# Patient Record
(demographics unavailable — no encounter records)

---

## 2025-04-01 NOTE — HISTORY OF PRESENT ILLNESS
[FreeTextEntry1] : HPI She is 29 y/o F with medical history of Rhabdomyolysis x 2. Patient states that about 3-4 years ago she had experienced myalgias after exercising. She was noted to have CPK of ~20,000. She was asked to hydrate and at that time there was no evidence of acute kidney Injury. She had no further episodes of rhabdo till recently when she did some weights and hot yoga following that. She experienced muscle stiffness which prompted her to go to Urgent care. There they noted her CPK to be 3000. She came to [facility] ED and repeat CPK was 4000. She was given IV fluids. Her renal function remained normal and her UA did not show any evidence of myoglobulins. At discharge from ED her CPK was ~3000. She was told to follow up with nephrology for which she is here today.   Medications: Estrogen based OCP Family History: No significant family history.  Social History: Works at an [employer] agency. Does not smoke, drink alcohol or does any illicit drugs.

## 2025-04-01 NOTE — ASSESSMENT
[FreeTextEntry1] : -> Repeat CPK, UA and CMP today to ensure resolution of elevated liver enzymes.  -> Rheumatology referral provided to investigate if she has metabolic myopathies. The reason to suspect this is that she has had two episodes of rhabdomyolysis w/o any prolonged or strenous exercise.   RTC PRN
(4) walks frequently

## 2025-04-01 NOTE — REVIEW OF SYSTEMS
[Fever] : no fever [Chills] : no chills [Shortness Of Breath] : no shortness of breath [Wheezing] : no wheezing [Cough] : no cough [SOB on Exertion] : no shortness of breath during exertion [Abdominal Pain] : no abdominal pain [Vomiting] : no vomiting [Dysuria] : no dysuria [Incontinence] : no incontinence [Confused] : no confusion [Convulsions] : no convulsions

## 2025-04-03 NOTE — HISTORY OF PRESENT ILLNESS
[FreeTextEntry1] : new CPE [de-identified] :  30 year old F presenting to establish care and to discuss the following:  June 2022 - highest CK about 20k March 2025 - rhabdo again   10 lb dumbbells each time  Solidcore and hot yoga.   Biceps and forearms -   Asthma - allergy induced. montelukast and zyrtec and steroid inhaler.   PSHx: Breast reduction 2017   Hospitalizations/ED visits:  Pneumonia as a child  4-5 concussions from soccer  Right ankle injury   Current Medications:  Birth control -   Allergies: Cefsil   Social Hx: Alcohol: 2-3 per week Tobacco: Never Drugs: None Sexual activity: Men - not recently.  Diet: Healthy. No red meat.   Exercise: Mood: Ok. Stressful work right. Major fatigue. Lives with: Alone Work: Advertising. Stressful right now. Feeling exhausted.    Family Hx: Mother: Unknown autoimmune condition.  Father: Siblings: Some people on mother's side with DM Paternal grandfather with MI

## 2025-04-03 NOTE — HISTORY OF PRESENT ILLNESS
[FreeTextEntry1] : new CPE [de-identified] :  30 year old F presenting to establish care and to discuss the following:  June 2022 - highest CK about 20k March 2025 - rhabdo again   10 lb dumbbells each time  Solidcore and hot yoga.   Biceps and forearms -   Asthma - allergy induced. montelukast and zyrtec and steroid inhaler.   PSHx: Breast reduction 2017   Hospitalizations/ED visits:  Pneumonia as a child  4-5 concussions from soccer  Right ankle injury   Current Medications:  Birth control -   Allergies: Cefsil   Social Hx: Alcohol: 2-3 per week Tobacco: Never Drugs: None Sexual activity: Men - not recently.  Diet: Healthy. No red meat.   Exercise: Mood: Ok. Stressful work right. Major fatigue. Lives with: Alone Work: Advertising. Stressful right now. Feeling exhausted.    Family Hx: Mother: Unknown autoimmune condition.  Father: Siblings: Some people on mother's side with DM Paternal grandfather with MI

## 2025-05-16 NOTE — HISTORY OF PRESENT ILLNESS
[de-identified] : Mid back [FreeTextEntry8] : 30 year old F with asthma and 2 recent episodes of rhabdomyolysis presenting today with lower back pain after dancing at a concert a few years ago. Mid lower pain with movement. Not radiating. No other symptoms.   No symptoms of rhabdo since last visit. Has been avoiding lifting weights.

## 2025-05-16 NOTE — REVIEW OF SYSTEMS
[Fever] : no fever [Chest Pain] : no chest pain [Palpitations] : no palpitations [Lower Ext Edema] : no lower extremity edema [Shortness Of Breath] : no shortness of breath [Cough] : no cough [Dyspnea on Exertion] : no dyspnea on exertion [Abdominal Pain] : no abdominal pain [Nausea] : no nausea [Constipation] : no constipation [Diarrhea] : diarrhea [Vomiting] : no vomiting [Dysuria] : no dysuria [Joint Pain] : no joint pain [Back Pain] : back pain

## 2025-05-16 NOTE — PHYSICAL EXAM
[No Acute Distress] : no acute distress [No Respiratory Distress] : no respiratory distress  [No Accessory Muscle Use] : no accessory muscle use [Clear to Auscultation] : lungs were clear to auscultation bilaterally [Normal Rate] : normal rate  [Regular Rhythm] : with a regular rhythm [Normal S1, S2] : normal S1 and S2 [Soft] : abdomen soft [Non Tender] : non-tender [No Rash] : no rash [Coordination Grossly Intact] : coordination grossly intact [Normal Affect] : the affect was normal [Normal Insight/Judgement] : insight and judgment were intact [de-identified] : Some mild tenderness over lumbar spine.

## 2025-05-20 NOTE — DISCUSSION/SUMMARY
Group Topic: BH Check-in/Symptom Rating    Date: 6/21/2021  Start Time: 0930  End Time: 1030  Facilitators: Colton Galloway CNA    Focus: morning gropup  Number in attendance: 15    Method: Group  Attendance: Present  Participation: Active  Patient Response: Good eye contact  Mood: Normal  Affect: Type: Euthymic (normal mood)   Range: Full (normal)   Congruency: Congruent   Stability: Stable  Behavior/Socialization: Cooperative  Thought Process: [place] thinking  Task Performance: Follows directions  Patient Evaluation: Independent - full participation       [de-identified] : ASSESSMENT Discussed findings of today's exam and possible cause of patient's pain.  Educated patient on their most probable diagnosis of mid thoracic left side paraspinal strain. On examination patient is neurologic intact both with motor and sensation and there is an absence of any red flag symptoms.  She is point tender over the paraspinal muscles of the left mid thoracic region. I explained that strains are defined as tears of the muscle-tendon unit that occurs during excessively forceful muscular stretch from lifting heavy objects or sudden twisting motions.  Sprains are ligamentous injuries (i.e ant/post longitudinal, capsular, inter/supra spinosus) that are caused by sudden violent contraction, sudden torsion, or foreceful straightening from a crouched position. Typical symptoms are pain and spasm localized over the posterior thoracic spinal muscles. ROM is usually limited secondary to pain and there is an absence of any neurologic signs/symptoms.  We reviewed possible courses of treatment, and we collaboratively decided best course of treatment at this time includes the following:  PLAN 1. Procedures: None at this time 2. Physical Therapy: Recommend gentle stretching and ROM exercises. Physical therapy was prescribed, to evaluate and treat, modalities and manual treatment as appropriate, taping as appropriate as well. 3. Medications/Treatments: Over-the-counter NSAIDs as needed 4. Imaging: None at this time.  I explained that MRI's are reserved for patients with red flags including fevers, chills, unintentional weight loss, dropped foot or severe weakness, bowel or bladder incontinence which are signs of medical emergencies including cancer, infection, severely pinched nerve, and a medical complication known as cauda equina syndrome. I advised to the patient that if any of these symptoms present then notify a physician immediately and present to the ER. 5. Labs: None 6. Orthopedic Supplies: None 7. Activity/Restrictions: Patient was counseled regarding activity/activity modification.  Activities as tolerated, avoiding any painful activities with only slow gradual advances as tolerated and once ready.  Advised to pay close attention on whether there is any pain during and/or after the activity, in which case if this occurs, the patient should back off on the activity. 8. Referrals: None 9. Follow-up: As needed  Patient appreciates and agrees with current plan.  This note was generated using Dragon medical dictation software.  A reasonable effort has been made for proofreading its contents, but typos may still remain.  If there are any questions or points of clarification needed, please notify my office.  This office visit included some or all of the following of both face-to-face time (preparation for visit-reviewing prior notes, performing H&P, ordering of medications, tests/ performing procedures, and counseling/education to the patient/family) and non-face-to-face time (deciding on recommendations to patients, independently interpreting tests, documentation in the EMR, communicating with other providers before or during the visit).    I have spent a total time of 45 minutes on this patient encounter on the same day of 5/20/2025.  Juan R Gonzalez MD, CAM

## 2025-05-20 NOTE — PHYSICAL EXAM
[de-identified] : THORACIC BACK EXAM ROM:  Flexion: Full 0-50 degrees with pain Extension: Full 0-45 degrees Sidebending: Full 0-40 degrees Rotation: Full 0-30 degrees  LUMBAR SPINE EXAM  Gait: Normal stride length, no trendelenberg gait, no antalgic gait Inspection: No more deformities or malalignment, normal curvature of the lumbosacral spine, good posture, no swelling or ecchymosis Assistive Devices: None  BACK TENDERNESS:  Spinous Processes: None Latissimus Dorsi (adduct, extend, internal rotation humerus) : None Longissimus Thoracis: None Iliocostalis: None Internal Obliques: None Paraspinal muscles (Psoas major, Quadratus Lumborum, Erector spinae group): Positive left side SI Joint: None Sacrum: None  ROM:  Flexion: Full 0-60 degrees Extension: Full 0-25 degrees Lateral Flexion (sidebending): Full 0-25 degrees Rotation: Full 0-15 degrees  DERMATOMAL SENSATION (bilateral):  L1 : Intact L2 : Intact L3 : Intact L4: Intact L5: Intact   S1: Intact  STRENGTH (bilateral):  Hip Flexion (L2/3): 5/5 Hip Adduction (L2): 5/5 Hip Abduction (L4/5/S1) : 5/5 Knee Extension (L3/4): 5/5 Knee Flexion (L5/S1): 5/5 Foot Plantarflexion (S1/2): 5/5 Ankle Dorsiflexion (L4): 5/5 Great Toe Extension: 5/5   REFLEXES:  Patellar: 2+ bilateral Achilles: 2+ bilateral Clonus: Negative bilateral  SPECIAL TESTING (bilateral):  SUPINE STRAIGHT LEG: Negative SLUMP: Negative  SI JOINT:  ASIS DISTRACTION (most specific-hold for 30): Negative ASIS COMPRESSION TEST (hold for 30): negative ELIUD SI PAIN: negative GAENSLEN'S TEST: negative STORK TEST: negative  RIGHT HIP:  LOG ROLL: Negative ELIUD: Negative FADIR: Negative  LEFT HIP:  LOG ROLL: Negative ELIUD: Negative FADIR: Negative  [de-identified] : XR lumbar spine 2 views AP lateral, XR thoracic spine 2 views AP/lateral Impression:

## 2025-05-20 NOTE — HISTORY OF PRESENT ILLNESS
[de-identified] : CC: This is a very pleasant 30-year-old female that presents to the office today as a new patient with back pain.  Initially noticed back pain after attending a concert on May 1.  She felt a pain in the left mid back thereafter.  She states that the pain persisted and then went to a yoga class on May 11 and on a flexion maneuver noticed increased pain.  She states the pain improved from that day however it is about the same as when she initially noticed it after attending the concert.  The pain is localized to the left mid back.  There is no numbness/tingling down her upper or lower extremities.  There is no weakness or recent falls.  Denies any bruising or swelling.  Has been taking Aleve for anti-inflammatory use.  She saw her primary care doctor on 516 and was advised to follow-up.  Referring Provider: Dr. Sherly Gerardo

## 2025-05-20 NOTE — PHYSICAL EXAM
[de-identified] : THORACIC BACK EXAM ROM:  Flexion: Full 0-50 degrees with pain Extension: Full 0-45 degrees Sidebending: Full 0-40 degrees Rotation: Full 0-30 degrees  LUMBAR SPINE EXAM  Gait: Normal stride length, no trendelenberg gait, no antalgic gait Inspection: No more deformities or malalignment, normal curvature of the lumbosacral spine, good posture, no swelling or ecchymosis Assistive Devices: None  BACK TENDERNESS:  Spinous Processes: None Latissimus Dorsi (adduct, extend, internal rotation humerus) : None Longissimus Thoracis: None Iliocostalis: None Internal Obliques: None Paraspinal muscles (Psoas major, Quadratus Lumborum, Erector spinae group): Positive left side SI Joint: None Sacrum: None  ROM:  Flexion: Full 0-60 degrees Extension: Full 0-25 degrees Lateral Flexion (sidebending): Full 0-25 degrees Rotation: Full 0-15 degrees  DERMATOMAL SENSATION (bilateral):  L1 : Intact L2 : Intact L3 : Intact L4: Intact L5: Intact   S1: Intact  STRENGTH (bilateral):  Hip Flexion (L2/3): 5/5 Hip Adduction (L2): 5/5 Hip Abduction (L4/5/S1) : 5/5 Knee Extension (L3/4): 5/5 Knee Flexion (L5/S1): 5/5 Foot Plantarflexion (S1/2): 5/5 Ankle Dorsiflexion (L4): 5/5 Great Toe Extension: 5/5   REFLEXES:  Patellar: 2+ bilateral Achilles: 2+ bilateral Clonus: Negative bilateral  SPECIAL TESTING (bilateral):  SUPINE STRAIGHT LEG: Negative SLUMP: Negative  SI JOINT:  ASIS DISTRACTION (most specific-hold for 30): Negative ASIS COMPRESSION TEST (hold for 30): negative ELIUD SI PAIN: negative GAENSLEN'S TEST: negative STORK TEST: negative  RIGHT HIP:  LOG ROLL: Negative ELIUD: Negative FADIR: Negative  LEFT HIP:  LOG ROLL: Negative ELIUD: Negative FADIR: Negative  [de-identified] : XR lumbar spine 2 views AP lateral, XR thoracic spine 2 views AP/lateral Impression:

## 2025-05-20 NOTE — HISTORY OF PRESENT ILLNESS
[de-identified] : CC: This is a very pleasant 30-year-old female that presents to the office today as a new patient with back pain.  Initially noticed back pain after attending a concert on May 1.  She felt a pain in the left mid back thereafter.  She states that the pain persisted and then went to a yoga class on May 11 and on a flexion maneuver noticed increased pain.  She states the pain improved from that day however it is about the same as when she initially noticed it after attending the concert.  The pain is localized to the left mid back.  There is no numbness/tingling down her upper or lower extremities.  There is no weakness or recent falls.  Denies any bruising or swelling.  Has been taking Aleve for anti-inflammatory use.  She saw her primary care doctor on 516 and was advised to follow-up.  Referring Provider: Dr. Sherly Gerardo

## 2025-05-20 NOTE — DISCUSSION/SUMMARY
[de-identified] : ASSESSMENT Discussed findings of today's exam and possible cause of patient's pain.  Educated patient on their most probable diagnosis of mid thoracic left side paraspinal strain. On examination patient is neurologic intact both with motor and sensation and there is an absence of any red flag symptoms.  She is point tender over the paraspinal muscles of the left mid thoracic region. I explained that strains are defined as tears of the muscle-tendon unit that occurs during excessively forceful muscular stretch from lifting heavy objects or sudden twisting motions.  Sprains are ligamentous injuries (i.e ant/post longitudinal, capsular, inter/supra spinosus) that are caused by sudden violent contraction, sudden torsion, or foreceful straightening from a crouched position. Typical symptoms are pain and spasm localized over the posterior thoracic spinal muscles. ROM is usually limited secondary to pain and there is an absence of any neurologic signs/symptoms.  We reviewed possible courses of treatment, and we collaboratively decided best course of treatment at this time includes the following:  PLAN 1. Procedures: None at this time 2. Physical Therapy: Recommend gentle stretching and ROM exercises. Physical therapy was prescribed, to evaluate and treat, modalities and manual treatment as appropriate, taping as appropriate as well. 3. Medications/Treatments: Over-the-counter NSAIDs as needed 4. Imaging: None at this time.  I explained that MRI's are reserved for patients with red flags including fevers, chills, unintentional weight loss, dropped foot or severe weakness, bowel or bladder incontinence which are signs of medical emergencies including cancer, infection, severely pinched nerve, and a medical complication known as cauda equina syndrome. I advised to the patient that if any of these symptoms present then notify a physician immediately and present to the ER. 5. Labs: None 6. Orthopedic Supplies: None 7. Activity/Restrictions: Patient was counseled regarding activity/activity modification.  Activities as tolerated, avoiding any painful activities with only slow gradual advances as tolerated and once ready.  Advised to pay close attention on whether there is any pain during and/or after the activity, in which case if this occurs, the patient should back off on the activity. 8. Referrals: None 9. Follow-up: As needed  Patient appreciates and agrees with current plan.  This note was generated using Dragon medical dictation software.  A reasonable effort has been made for proofreading its contents, but typos may still remain.  If there are any questions or points of clarification needed, please notify my office.  This office visit included some or all of the following of both face-to-face time (preparation for visit-reviewing prior notes, performing H&P, ordering of medications, tests/ performing procedures, and counseling/education to the patient/family) and non-face-to-face time (deciding on recommendations to patients, independently interpreting tests, documentation in the EMR, communicating with other providers before or during the visit).    I have spent a total time of 45 minutes on this patient encounter on the same day of 5/20/2025.  Juan R Gonzalez MD, CAM

## 2025-06-10 NOTE — HISTORY OF PRESENT ILLNESS
[FreeTextEntry1] : CC: history of Rhabdomyolysis CK has increased to 4K after exercise  last 3/2025 and 1st 2021    HPI: 30 y old with PMH of Seasonal allergies and asthma, 2 episode of Rhabdomyolysis with CK increased up to 4K after exercise and first time 2021,  with episodes has muscle pain improved with hydration  history of Lower back pain with lumbar X ray mild DJD   no joint swelling or stiffness,  used to play soccer had BL Knee ACL injury and R ankle injury L elbow also no fractures  used to play in the school   no psoriasis, no photosensitive rash, no increased hair loss,  no dry eyes or mouth no history of uveitis or scleritis  no oral or genital ulcers, no sob no chest pain, no cough , nasal congestion with seasonal allergies and cough, no trouble swallowing, no abd pain, no blood in urine or stool, no persistent diarrhea no recent or recurrent infections   ALL Cefcill -- as a child rash SH : alcohol socially, no other toxic habits  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

## 2025-06-10 NOTE — ASSESSMENT
[FreeTextEntry1] :  A and P   30 y old F With PMH of Seasonal allergies and Asthma, multiple joint trauma with playing soccer in school- has not played since then, exercising regularly but had 2 episode of Rhabdomyolysis first 2021 possible after exercise and dehydration improved with IV fluids and second recent episode 3/2025 evaluated at [facility] ED CK went up to 4K and improved with IV hydration , muscle pain with rhabdomyolysis no sig weakness , history of lower back pain also and intermittent joint pain attributes it to previous trauma , no fractures -recent episode of Rhabdomyelsis after exercising with 10 lbs 3 sets that was not issue for her in the past since then stopped exercising with weights but still does regular cardio -denies recent new medications or supplements  -no other symptoms of myositis or systemic connective tissue disease , MEMO was negative but will evaluate further with serologies autoimmune myositis is less likely discussed with patient evaluate for inflammatory arthritis but less likely  -follow up 2-3 weeks

## 2025-06-10 NOTE — PHYSICAL EXAM
[General Appearance - In No Acute Distress] : in no acute distress [PERRL With Normal Accommodation] : pupils were equal in size, round, and reactive to light [Examination Of The Oral Cavity] : the lips and gums were normal [Oropharynx] : the oropharynx was normal [Respiration, Rhythm And Depth] : normal respiratory rhythm and effort [Auscultation Breath Sounds / Voice Sounds] : lungs were clear to auscultation bilaterally [Heart Rate And Rhythm] : heart rate was normal and rhythm regular [Heart Sounds] : normal S1 and S2 [Edema] : there was no peripheral edema [Murmurs] : no murmurs [Bowel Sounds] : normal bowel sounds [Abdomen Soft] : soft [Abdomen Tenderness] : non-tender [No Spinal Tenderness] : no spinal tenderness [] : no rash [Motor Exam] : the motor exam was normal

## 2025-07-22 NOTE — ASSESSMENT
[FreeTextEntry1] :  A and P   30 y old F With PMH of Seasonal allergies and Asthma, multiple joint trauma with playing soccer in school- has not played since then, exercising regularly but had 2 episode of Rhabdomyolysis first 2021 possible after exercise and dehydration improved with IV fluids and second recent episode 3/2025 evaluated at [facility] ED CK went up to 4K and improved with IV hydration , muscle pain with rhabdomyolysis no sig weakness , history of lower back pain also and intermittent joint pain attributes it to previous trauma , no fractures -recent episode of Rhabdomyelsis after exercising with 10 lbs 3 sets that was not issue for her in the past since then stopped exercising with weights but still does regular cardio -denies recent new medications or supplements , no new symptoms since last visit  -no other symptoms of myositis or systemic connective tissue disease , 4/3/2025 MEMO was negative -6/10/2025 workup normal CK, aldolase , negative RF, CCP, Myomarker panel 4/3/2025 negative MEMO  -discussed that workup negative for inflammatory arthritis autoimmune myositis and also underlying systemic connective tissue disease  -follow up with us as needed -if recurrent Rhabdomyolysis will need evaluation by Neurology   -follow up as needed

## 2025-07-22 NOTE — HISTORY OF PRESENT ILLNESS
[FreeTextEntry1] : 7/22/2025 with history of Rhabdomyolysis twice CK increased to 4 K and improved with hydration after exercise no new symptoms intermittent muscle pain 6/10/2025 workup normal CK, aldolase , negative RF, CCP, Myomarker panel 4/3/2025 negative MEMO

## 2025-07-22 NOTE — PHYSICAL EXAM
[General Appearance - In No Acute Distress] : in no acute distress [PERRL With Normal Accommodation] : pupils were equal in size, round, and reactive to light [Examination Of The Oral Cavity] : the lips and gums were normal [Oropharynx] : the oropharynx was normal [Respiration, Rhythm And Depth] : normal respiratory rhythm and effort [Auscultation Breath Sounds / Voice Sounds] : lungs were clear to auscultation bilaterally [Heart Rate And Rhythm] : heart rate was normal and rhythm regular [Heart Sounds] : normal S1 and S2 [Murmurs] : no murmurs [Edema] : there was no peripheral edema [Bowel Sounds] : normal bowel sounds [Abdomen Soft] : soft [Abdomen Tenderness] : non-tender [No Spinal Tenderness] : no spinal tenderness [] : no rash [Motor Exam] : the motor exam was normal